# Patient Record
Sex: MALE
[De-identification: names, ages, dates, MRNs, and addresses within clinical notes are randomized per-mention and may not be internally consistent; named-entity substitution may affect disease eponyms.]

---

## 2017-09-05 ENCOUNTER — HOSPITAL (OUTPATIENT)
Dept: OTHER | Age: 52
End: 2017-09-05
Attending: HOSPITALIST

## 2017-09-05 LAB
ALBUMIN SERPL-MCNC: 4.3 GM/DL (ref 3.6–5.1)
ALBUMIN/GLOB SERPL: 1.1 {RATIO} (ref 1–2.4)
ALP SERPL-CCNC: 59 UNIT/L (ref 45–117)
ALT SERPL-CCNC: 38 UNIT/L
ANALYZER ANC (IANC): ABNORMAL
ANION GAP SERPL CALC-SCNC: 17 MMOL/L (ref 10–20)
APTT PPP: 25 SECONDS (ref 22–30)
APTT PPP: NORMAL S
AST SERPL-CCNC: 57 UNIT/L
BASOPHILS # BLD: 0 THOUSAND/MCL (ref 0–0.3)
BASOPHILS NFR BLD: 0 %
BILIRUB SERPL-MCNC: 1 MG/DL (ref 0.2–1)
BUN SERPL-MCNC: 16 MG/DL (ref 6–20)
BUN/CREAT SERPL: 21 (ref 7–25)
CALCIUM SERPL-MCNC: 9.5 MG/DL (ref 8.4–10.2)
CHLORIDE: 99 MMOL/L (ref 98–107)
CO2 SERPL-SCNC: 24 MMOL/L (ref 21–32)
CREAT SERPL-MCNC: 0.78 MG/DL (ref 0.67–1.17)
DIFFERENTIAL METHOD BLD: ABNORMAL
EOSINOPHIL # BLD: 0.3 THOUSAND/MCL (ref 0.1–0.5)
EOSINOPHIL NFR BLD: 3 %
ERYTHROCYTE [DISTWIDTH] IN BLOOD: 12.5 % (ref 11–15)
GLOBULIN SER-MCNC: 4 GM/DL (ref 2–4)
GLUCOSE BLDC GLUCOMTR-MCNC: 144 MG/DL (ref 65–99)
GLUCOSE BLDC GLUCOMTR-MCNC: 169 MG/DL (ref 65–99)
GLUCOSE SERPL-MCNC: 210 MG/DL (ref 65–99)
HEMATOCRIT: 44 % (ref 39–51)
HGB BLD-MCNC: 16.1 GM/DL (ref 13–17)
INR PPP: 1.1
LYMPHOCYTES # BLD: 1.6 THOUSAND/MCL (ref 1–4)
LYMPHOCYTES NFR BLD: 20 %
MCH RBC QN AUTO: 33.8 PG (ref 26–34)
MCHC RBC AUTO-ENTMCNC: 36.6 GM/DL (ref 32–36.5)
MCV RBC AUTO: 92.2 FL (ref 78–100)
MONOCYTES # BLD: 0.7 THOUSAND/MCL (ref 0.3–0.9)
MONOCYTES NFR BLD: 9 %
NEUTROPHILS # BLD: 5.5 THOUSAND/MCL (ref 1.8–7.7)
NEUTROPHILS NFR BLD: 68 %
NEUTS SEG NFR BLD: ABNORMAL %
PERCENT NRBC: ABNORMAL
PLATELET # BLD: 154 THOUSAND/MCL (ref 140–450)
POTASSIUM SERPL-SCNC: 4.5 MMOL/L (ref 3.4–5.1)
PROT SERPL-MCNC: 8.3 GM/DL (ref 6.4–8.2)
PROTHROMBIN TIME: 11.5 SECONDS (ref 9.7–11.8)
PROTHROMBIN TIME: NORMAL
RBC # BLD: 4.77 MILLION/MCL (ref 4.5–5.9)
SODIUM SERPL-SCNC: 135 MMOL/L (ref 135–145)
TROPONIN I SERPL HS-MCNC: <0.02 NG/ML
WBC # BLD: 8 THOUSAND/MCL (ref 4.2–11)

## 2017-09-06 ENCOUNTER — DIAGNOSTIC TRANS (OUTPATIENT)
Dept: OTHER | Age: 52
End: 2017-09-06

## 2017-09-06 LAB
ALBUMIN SERPL-MCNC: 3.9 GM/DL (ref 3.6–5.1)
ALBUMIN/GLOB SERPL: 1.1 {RATIO} (ref 1–2.4)
ALP SERPL-CCNC: 59 UNIT/L (ref 45–117)
ALT SERPL-CCNC: 38 UNIT/L
ANALYZER ANC (IANC): ABNORMAL
ANION GAP SERPL CALC-SCNC: 12 MMOL/L (ref 10–20)
AST SERPL-CCNC: 43 UNIT/L
BASOPHILS # BLD: 0 THOUSAND/MCL (ref 0–0.3)
BASOPHILS NFR BLD: 0 %
BILIRUB SERPL-MCNC: 1 MG/DL (ref 0.2–1)
BUN SERPL-MCNC: 14 MG/DL (ref 6–20)
BUN/CREAT SERPL: 16 (ref 7–25)
CALCIUM SERPL-MCNC: 9.1 MG/DL (ref 8.4–10.2)
CHLORIDE: 101 MMOL/L (ref 98–107)
CHOLEST SERPL-MCNC: 104 MG/DL
CHOLEST/HDLC SERPL: 3.4 {RATIO}
CO2 SERPL-SCNC: 27 MMOL/L (ref 21–32)
CREAT SERPL-MCNC: 0.87 MG/DL (ref 0.67–1.17)
DIFFERENTIAL METHOD BLD: ABNORMAL
EOSINOPHIL # BLD: 0.3 THOUSAND/MCL (ref 0.1–0.5)
EOSINOPHIL NFR BLD: 4 %
ERYTHROCYTE [DISTWIDTH] IN BLOOD: 12.6 % (ref 11–15)
GLOBULIN SER-MCNC: 3.7 GM/DL (ref 2–4)
GLUCOSE BLDC GLUCOMTR-MCNC: 184 MG/DL (ref 65–99)
GLUCOSE BLDC GLUCOMTR-MCNC: 194 MG/DL (ref 65–99)
GLUCOSE SERPL-MCNC: 163 MG/DL (ref 65–99)
H CAPSUL MYC AB TITR SER CF: ABNORMAL {TITER}
H CAPSUL YST AB TITR SER CF: ABNORMAL {TITER}
HDLC SERPL-MCNC: 31 MG/DL
HEMATOCRIT: 40 % (ref 39–51)
HGB BLD-MCNC: 14.3 GM/DL (ref 13–17)
LDLC SERPL CALC-MCNC: 22 MG/DL
LYMPHOCYTES # BLD: 1.7 THOUSAND/MCL (ref 1–4)
LYMPHOCYTES NFR BLD: 24 %
MCH RBC QN AUTO: 33.2 PG (ref 26–34)
MCHC RBC AUTO-ENTMCNC: 35.8 GM/DL (ref 32–36.5)
MCV RBC AUTO: 92.8 FL (ref 78–100)
MONOCYTES # BLD: 0.8 THOUSAND/MCL (ref 0.3–0.9)
MONOCYTES NFR BLD: 11 %
NEUTROPHILS # BLD: 4.4 THOUSAND/MCL (ref 1.8–7.7)
NEUTROPHILS NFR BLD: 61 %
NEUTS SEG NFR BLD: ABNORMAL %
NONHDLC SERPL-MCNC: 73 MG/DL
PERCENT NRBC: ABNORMAL
PLATELET # BLD: 139 THOUSAND/MCL (ref 140–450)
POTASSIUM SERPL-SCNC: 4 MMOL/L (ref 3.4–5.1)
PROT SERPL-MCNC: 7.6 GM/DL (ref 6.4–8.2)
RBC # BLD: 4.31 MILLION/MCL (ref 4.5–5.9)
SODIUM SERPL-SCNC: 136 MMOL/L (ref 135–145)
TRIGLYCERIDE (TRIGP): 255 MG/DL
WBC # BLD: 7.2 THOUSAND/MCL (ref 4.2–11)

## 2017-10-14 ENCOUNTER — HOSPITAL (OUTPATIENT)
Dept: OTHER | Age: 52
End: 2017-10-14
Attending: PSYCHIATRY & NEUROLOGY

## 2017-10-20 ENCOUNTER — HOSPITAL (OUTPATIENT)
Dept: OTHER | Age: 52
End: 2017-10-20
Attending: PSYCHIATRY & NEUROLOGY

## 2017-10-20 LAB
# SPEC OBTAINED: 4
ACE CSF-CCNC: 0.8 U/L
ANALYZER ANC (IANC): ABNORMAL
APPEARANCE CSF: NORMAL
B BURGDOR IGG CSF QL IB: <0.8
B BURGDOR IGM CSF QL IB: <0.8
BASOPHILS NFR CSF: NORMAL %
C GATTII+NEOFOR DNA CSF QL NAA+NON-PROBE: NOT DETECTED
CMV DNA CSF QL NAA+NON-PROBE: NOT DETECTED
CSF DIFFERENTIAL REMARKS(CREM): NORMAL
E COLI K1 DNA CSF QL NAA+NON-PROBE: NOT DETECTED
EOSINOPHIL NFR CSF: NORMAL %
ERYTHROCYTE [DISTWIDTH] IN BLOOD: 12.9 % (ref 11–15)
EV RNA CSF QL NAA+NON-PROBE: NOT DETECTED
GLUCOSE BLDC GLUCOMTR-MCNC: 117 MG/DL (ref 65–99)
GLUCOSE CSF-MCNC: 77 MG/DL (ref 40–70)
GP B STREP DNA CSF QL NAA+NON-PROBE: NOT DETECTED
GP B STREP DNA CSF QL NAA+NON-PROBE: NOT DETECTED
HAEM INFLU DNA CSF QL NAA+NON-PROBE: NOT DETECTED
HEMATOCRIT: 40.5 % (ref 39–51)
HGB BLD-MCNC: 14.4 GM/DL (ref 13–17)
HHV6 DNA CSF QL NAA+NON-PROBE: NOT DETECTED
HSV1 DNA CSF QL NAA+NON-PROBE: NOT DETECTED
HSV2 DNA CSF QL NAA+NON-PROBE: NOT DETECTED
INR PPP: 1
L MONOCYTOG DNA CSF QL NAA+NON-PROBE: NOT DETECTED
LYMPHOCYTES NFR CSF: NORMAL %
MCH RBC QN AUTO: 33.2 PG (ref 26–34)
MCHC RBC AUTO-ENTMCNC: 35.6 GM/DL (ref 32–36.5)
MCV RBC AUTO: 93.3 FL (ref 78–100)
MONOS+MACROS NFR CSF: NORMAL %
N MEN DNA CSF QL NAA+NON-PROBE: NOT DETECTED
NEUTS SEG NFR CSF: NORMAL %
NUC CELL # CSF: 3 /MCL (ref 0–10)
PARECHOVIRUS A RNA CSF QL NAA+NON-PROBE: NOT DETECTED
PLATELET # BLD: 152 THOUSAND/MCL (ref 140–450)
PROT CSF-MCNC: 53 MG/DL (ref 15–45)
PROTHROMBIN TIME: 11.1 SECONDS (ref 9.7–11.8)
PROTHROMBIN TIME: NORMAL
RBC # BLD: 4.34 MILLION/MCL (ref 4.5–5.9)
RBC # CSF: NORMAL /MCL
SPECIMEN SOURCE: NORMAL
SPECIMEN VOL CSF: 15 ML
TUBE # CSF: 4
VARICELLA ZOSTER VIRUS (RMEPNL): NOT DETECTED
VDRL CSF QL: NONREACTIVE
WBC # BLD: 6.5 THOUSAND/MCL (ref 4.2–11)
XANTHOCHROMIA CSF QL: NORMAL

## 2017-11-08 ENCOUNTER — HOSPITAL (OUTPATIENT)
Dept: OTHER | Age: 52
End: 2017-11-08
Attending: PSYCHIATRY & NEUROLOGY

## 2017-11-18 ENCOUNTER — HOSPITAL (OUTPATIENT)
Dept: OTHER | Age: 52
End: 2017-11-18
Attending: PSYCHIATRY & NEUROLOGY

## 2017-11-22 ENCOUNTER — HOSPITAL (OUTPATIENT)
Dept: OTHER | Age: 52
End: 2017-11-22
Attending: PSYCHIATRY & NEUROLOGY

## 2017-11-22 ENCOUNTER — TELEPHONE (OUTPATIENT)
Dept: NEUROLOGY | Facility: CLINIC | Age: 52
End: 2017-11-22

## 2017-12-01 ENCOUNTER — OFFICE VISIT (OUTPATIENT)
Dept: NEUROLOGY | Facility: CLINIC | Age: 52
End: 2017-12-01

## 2017-12-01 VITALS
HEIGHT: 73 IN | DIASTOLIC BLOOD PRESSURE: 60 MMHG | HEART RATE: 88 BPM | WEIGHT: 224 LBS | BODY MASS INDEX: 29.69 KG/M2 | SYSTOLIC BLOOD PRESSURE: 102 MMHG

## 2017-12-01 DIAGNOSIS — G62.9 PERIPHERAL POLYNEUROPATHY: ICD-10-CM

## 2017-12-01 DIAGNOSIS — G62.9 CRANIAL NEUROPATHY: Primary | ICD-10-CM

## 2017-12-01 PROCEDURE — 99244 OFF/OP CNSLTJ NEW/EST MOD 40: CPT | Performed by: OTHER

## 2017-12-01 RX ORDER — ATORVASTATIN CALCIUM 40 MG/1
40 TABLET, FILM COATED ORAL DAILY
COMMUNITY
Start: 2017-09-18

## 2017-12-01 RX ORDER — LOSARTAN POTASSIUM 100 MG/1
100 TABLET ORAL DAILY
COMMUNITY

## 2017-12-01 RX ORDER — GLYBURIDE 5 MG/1
5 TABLET ORAL 2 TIMES DAILY
COMMUNITY

## 2017-12-01 RX ORDER — MECLIZINE HYDROCHLORIDE 25 MG/1
25 TABLET ORAL 3 TIMES DAILY PRN
COMMUNITY
End: 2018-01-05

## 2017-12-01 NOTE — PROGRESS NOTES
Dianna Yao : 1965     HPI:   Patient presents with:  Dizziness: Patient is here for dizziness, stated he feels dizzy and lightheaded from sitting down to standing for the past few months.  Has had MRIs and CT scans done with prior neurologist, and l walks for a long period of time. His wife has noted some hearing loss in the left ear. Current Outpatient Prescriptions:  atorvastatin 40 MG Oral Tab Take 40 mg by mouth daily. Disp:  Rfl:    losartan 100 MG Oral Tab Take 100 mg by mouth daily.  Disp: Vitals:  /60 (BP Location: Right arm, Patient Position: Sitting, Cuff Size: adult)   Pulse 88   Ht 73\"   Wt 224 lb   BMI 29.55 kg/m²  blood pressure is 100/60 standing. General appearance:  In no distress  CV: No  Evidence of Carotid Bruits, Reg of the brain was reviewed, and I do not see any abnormalities in the brainstem. This may be inflammatory in the CP angle, however, cerebral spinal fluid analysis is unremarkable. Clinically he feels he is improving.   He has referrals for vestibular exe

## 2017-12-04 ENCOUNTER — HOSPITAL (OUTPATIENT)
Dept: OTHER | Age: 52
End: 2017-12-04
Attending: OTOLARYNGOLOGY

## 2017-12-18 ENCOUNTER — MED REC SCAN ONLY (OUTPATIENT)
Dept: NEUROLOGY | Facility: CLINIC | Age: 52
End: 2017-12-18

## 2018-01-01 ENCOUNTER — HOSPITAL (OUTPATIENT)
Dept: OTHER | Age: 53
End: 2018-01-01
Attending: OTOLARYNGOLOGY

## 2018-01-05 ENCOUNTER — OFFICE VISIT (OUTPATIENT)
Dept: NEUROLOGY | Facility: CLINIC | Age: 53
End: 2018-01-05

## 2018-01-05 VITALS
SYSTOLIC BLOOD PRESSURE: 150 MMHG | RESPIRATION RATE: 16 BRPM | DIASTOLIC BLOOD PRESSURE: 70 MMHG | WEIGHT: 225 LBS | BODY MASS INDEX: 29.82 KG/M2 | HEIGHT: 73 IN | HEART RATE: 80 BPM

## 2018-01-05 DIAGNOSIS — G62.9 CRANIAL NEUROPATHY: Primary | ICD-10-CM

## 2018-01-05 PROCEDURE — 99213 OFFICE O/P EST LOW 20 MIN: CPT | Performed by: OTHER

## 2018-01-05 NOTE — PROGRESS NOTES
Ector Najjar : 1965     HPI:   Patient presents with:  Dizziness: Patient presents for a follow up on dizziness.  States he is doing therapy and it is improved, sometimes will feel dizzy if tired and turns too fast. Also stated his bells palsey is imp Smokeless tobacco: Never Used                        Alcohol use:  Yes                Comment: Occasionally  Other Topics            Concern  Caffeine Concern        Yes    Comment:1 cup of coffee a week  Exercise                No Stereognosis intact  DTR: 2+ in the upper and lower extremities, depressed ankles. No Babinski, no hoffmans, no clonus  Coordination: Finger to nose, heel to shin intact bilaterally. Gait: Narrow based, negative Romberg’s sign.  Can stand on heels and

## 2018-02-01 ENCOUNTER — HOSPITAL (OUTPATIENT)
Dept: OTHER | Age: 53
End: 2018-02-01
Attending: OTOLARYNGOLOGY

## 2018-02-27 ENCOUNTER — OFFICE VISIT (OUTPATIENT)
Dept: NEUROLOGY | Facility: CLINIC | Age: 53
End: 2018-02-27

## 2018-02-27 ENCOUNTER — TELEPHONE (OUTPATIENT)
Dept: NEUROLOGY | Facility: CLINIC | Age: 53
End: 2018-02-27

## 2018-02-27 VITALS
RESPIRATION RATE: 16 BRPM | SYSTOLIC BLOOD PRESSURE: 130 MMHG | DIASTOLIC BLOOD PRESSURE: 72 MMHG | BODY MASS INDEX: 29.82 KG/M2 | HEART RATE: 84 BPM | HEIGHT: 73 IN | WEIGHT: 225 LBS

## 2018-02-27 DIAGNOSIS — G62.9 CRANIAL NEUROPATHY: Primary | ICD-10-CM

## 2018-02-27 PROCEDURE — 99213 OFFICE O/P EST LOW 20 MIN: CPT | Performed by: OTHER

## 2018-02-27 NOTE — PROGRESS NOTES
Cadenshagufta Huddleston : 1965     HPI:   Patient presents with:  Neurologic Problem: LOV: 18. F/U on cranial neuropathy. Patient states he is doing much better. He states that he completed PT and feels it really helped.  He states that the dizziness has sub history. History reviewed. No pertinent surgical history. History reviewed. No pertinent family history.    Social History:  Social History    Marital status:              Spouse name:                       Years of education:                 Num peripheral facial weakness, but his face does move, and he is able to close his left eye. VIII- Auditory acuity symmetric. IX,X- Palate elevates in midline. XI- Shoulder shrug symmetric. XII- Tongue in midline, without atrophy.       Motor: 5/5 strength i

## 2018-02-28 NOTE — TELEPHONE ENCOUNTER
Called [facility] BS for authorization of approval of MRI brain w/wo. Talked to [id number]. who states no authorization is not required. Reference #  [id number]. Will call Pt. to inform. Pt. Informed no authorization is required.  MRI is scheduled on 03

## 2018-03-10 ENCOUNTER — HOSPITAL ENCOUNTER (OUTPATIENT)
Dept: MRI IMAGING | Age: 53
Discharge: HOME | End: 2018-03-10
Attending: Other
Payer: COMMERCIAL

## 2018-03-10 DIAGNOSIS — G62.9 CRANIAL NEUROPATHY: ICD-10-CM

## 2018-03-10 LAB — CREAT BLD-MCNC: 0.9 MG/DL (ref 0.5–1.5)

## 2018-03-10 PROCEDURE — 70553 MRI BRAIN STEM W/O & W/DYE: CPT | Performed by: OTHER

## 2018-03-10 PROCEDURE — A9575 INJ GADOTERATE MEGLUMI 0.1ML: HCPCS | Performed by: OTHER

## 2018-03-10 PROCEDURE — 82565 ASSAY OF CREATININE: CPT

## 2018-03-15 ENCOUNTER — TELEPHONE (OUTPATIENT)
Dept: NEUROLOGY | Facility: CLINIC | Age: 53
End: 2018-03-15

## 2018-03-16 NOTE — TELEPHONE ENCOUNTER
MRI show some enhancement around VII and VIII, but improved compared to Oct.  Called Patient. Voice Mail.

## 2018-06-06 ENCOUNTER — OFFICE VISIT (OUTPATIENT)
Dept: NEUROLOGY | Facility: CLINIC | Age: 53
End: 2018-06-06

## 2018-06-06 VITALS
BODY MASS INDEX: 32.2 KG/M2 | SYSTOLIC BLOOD PRESSURE: 120 MMHG | DIASTOLIC BLOOD PRESSURE: 60 MMHG | HEART RATE: 70 BPM | HEIGHT: 73 IN | WEIGHT: 243 LBS

## 2018-06-06 DIAGNOSIS — G51.0 BELL'S PALSY: Primary | ICD-10-CM

## 2018-06-06 PROCEDURE — 99215 OFFICE O/P EST HI 40 MIN: CPT | Performed by: OTHER

## 2018-06-06 NOTE — PROGRESS NOTES
He relates no new neurological symptoms. I reviewed the previous neurology records, epic records. He completed physical therapy at [facility]. He is performing exercises at home. He works in information technology security.   Relates rare paresthesi

## 2018-09-11 ENCOUNTER — OFFICE VISIT (OUTPATIENT)
Dept: NEUROLOGY | Facility: CLINIC | Age: 53
End: 2018-09-11
Payer: COMMERCIAL

## 2018-09-11 ENCOUNTER — TELEPHONE (OUTPATIENT)
Dept: NEUROLOGY | Facility: CLINIC | Age: 53
End: 2018-09-11

## 2018-09-11 VITALS
WEIGHT: 245 LBS | DIASTOLIC BLOOD PRESSURE: 80 MMHG | HEART RATE: 84 BPM | HEIGHT: 73 IN | SYSTOLIC BLOOD PRESSURE: 120 MMHG | BODY MASS INDEX: 32.47 KG/M2 | RESPIRATION RATE: 16 BRPM

## 2018-09-11 DIAGNOSIS — R27.0 ATAXIA: Primary | ICD-10-CM

## 2018-09-11 PROCEDURE — 99213 OFFICE O/P EST LOW 20 MIN: CPT | Performed by: OTHER

## 2018-09-11 NOTE — TELEPHONE ENCOUNTER
[facility] BS for authorization of approval of MRI brain w/wo cpt code 05076. Call center is now closed.  Will call in the am.

## 2018-09-11 NOTE — PROGRESS NOTES
He relates no neurological change. Still has residual left Bell's palsy. No change in speech. Still has some trouble with balance, especially when he is looking up or down. No vertigo. No trouble swallowing. No visual symptoms.   He denies upper lower

## 2018-09-12 NOTE — TELEPHONE ENCOUNTER
[facility] BS to request authorization for MRI brain w/wo cpt code   09883. T/t Fiona ARCHER who states no authoirzation is required. Reference # [id number]. Will call Pt. To inform. Pt. Informed of approval. He will call later to schedule appt.

## 2018-10-25 ENCOUNTER — HOSPITAL ENCOUNTER (OUTPATIENT)
Dept: MRI IMAGING | Age: 53
Discharge: HOME | End: 2018-10-25
Attending: Other
Payer: COMMERCIAL

## 2018-10-25 ENCOUNTER — APPOINTMENT (OUTPATIENT)
Dept: LAB | Age: 53
End: 2018-10-25
Attending: Other
Payer: COMMERCIAL

## 2018-10-25 DIAGNOSIS — R27.0 ATAXIA: ICD-10-CM

## 2018-10-25 PROCEDURE — 36415 COLL VENOUS BLD VENIPUNCTURE: CPT

## 2018-10-25 PROCEDURE — A9575 INJ GADOTERATE MEGLUMI 0.1ML: HCPCS | Performed by: OTHER

## 2018-10-25 PROCEDURE — 82565 ASSAY OF CREATININE: CPT

## 2018-10-25 PROCEDURE — 70553 MRI BRAIN STEM W/O & W/DYE: CPT | Performed by: OTHER

## 2018-10-25 PROCEDURE — 82607 VITAMIN B-12: CPT

## 2018-10-31 ENCOUNTER — TELEPHONE (OUTPATIENT)
Dept: NEUROLOGY | Facility: CLINIC | Age: 53
End: 2018-10-31

## 2018-10-31 NOTE — TELEPHONE ENCOUNTER
Spoke with pt and informed him of the normal MRI and blood tests per Dr. Trisha Flannery in previous office note. Pt verbalized understanding.

## (undated) NOTE — LETTER
[address], [address], [address] [facility], [address] [place] [address], [address]  . [address]  [phone]        Dear Kevin Allred, MD WALTER,      I had the pleasure of seeing your patient, Cherylene Ormond on 1/5/2018.      Below please fi History reviewed. No pertinent past medical history. History reviewed. No pertinent surgical history. History reviewed. No pertinent family history.    Social History:  Social History    Marital status:              Spouse name: VII-left peripheral facial weakness, and he still is not able to completely close his left eye. Bailey Riedel VIII- Auditory acuity symmetric. IX,X- Palate elevates in midline. XI- Shoulder shrug symmetric. XII- Tongue in midline, without atrophy.       Motor: 5/5 stre

## (undated) NOTE — LETTER
[address], [address], [address] [facility], [address] [place] [address], [address]  . [address]  [phone]        Dear Dinora Brower MD, MD,      I had the pleasure of seeing your patient, Fang Péerz on 2/27/2018.      Below please f losartan 100 MG Oral Tab Take 100 mg by mouth daily. Disp:  Rfl:    glyBURIDE 5 MG Oral Tab Take 5 mg by mouth 2 (two) times daily. Disp:  Rfl:    aspirin 81 MG Oral Tab Take 81 mg by mouth daily.  Disp:  Rfl:    MetFORMIN HCl 1000 MG Oral Tab Take 1,000 mg Higher Integrative Functions:  Alert and cooperative, with normal attention span and concentration. Speech is mildly dysarthric, with normal language function. .  Oriented times three.      Cranial Nerves: II-Visual acuity grossly normal, with full visual f

## (undated) NOTE — LETTER
[address], [address], [address] [facility], [address] [place] [address], [address]  . [address]  [phone]        Dear Fernando Gross,    I had the pleasure of seeing your patient, Dianna Yao on 9/11/2018.     Below please find a summary of

## (undated) NOTE — LETTER
[address] [facility], [address] [place] [address], [address]  [address]  [phone]        Dear Acosta Anderson MD, MD,      I had the pleasure of seeing your patient, Baron Wing on 12/1/2017.      Below please find a summary o He had a slight elevated protein of 53, but no white cells. There was no evidence of infection on the spinal fluid results. Sedimentation rate and SAPNA testing also were unremarkable. Since then he feels his face has gotten slightly better.   He finds HEENT: denies nasal congestion, sinus pain or sore throat; hearing loss negative  RESPIRATORY: denies shortness of breath, wheezing or cough   CARDIOVASCULAR: denies chest pain or BANEGAS; no palpitations   GI: denies nausea, vomiting, constipation, diarrhea; DTR: 2+ in the upper and lower extremities, with absent ankles. No Babinski, no hoffmans, no clonus  Coordination: Finger to nose, heel to shin intact bilaterally. Gait: Narrow based, negative Romberg’s sign. Can stand on heels and toes.   Mild difficu

## (undated) NOTE — LETTER
[address], [address], [address] [facility], [address] [place] [address], [address]  . [address]  [phone]        Dear Yady Galvez,      I had the pleasure of seeing your patient, Rani Ordonez on 6/6/2018.      Below please find a summary